# Patient Record
(demographics unavailable — no encounter records)

---

## 2025-02-15 NOTE — CONSULT LETTER
[FreeTextEntry2] : Miri Hua MD  [FreeTextEntry3] : Luis Alberto Downing MD Surgeon in Chief , Surgery  & System Chief, Pediatric Surgery Professor Surgery and Pediatrics [facility]

## 2025-02-15 NOTE — CONSULT LETTER
[FreeTextEntry2] : Miri Hua MD  [FreeTextEntry3] : Luis Alberto Downing MD Surgeon in Chief , Surgery  & System Chief, Pediatric Surgery Professor Surgery and Pediatrics [facility]

## 2025-02-15 NOTE — ASSESSMENT
[FreeTextEntry1] : Kt is a 5-year-old male here to follow up for a right inguinal scrotal testis and a possible left reducible inguinal hernia.   On exam today, the left testes was present in the scrotum and a left reducible inguinal hernia was present. The right testes was undescended, and palpable in the right inguinal canal. I counseled the family and discussed the nature of both in great detail.   I recommended left, possible right inguinal hernia repair. I discussed the indications, risks, benefits and alternatives to the procedure. The risks discussed included but were not limited to bleeding, infection, injury to intra-abdominal/pelvic contents, injury to spermatic cord/testicular loss, postoperative hydrocele and hernia recurrence. I counseled them about the possibility of developing an incarcerated hernia and they know to bring Kt to the emergency room with any concerns. I then explained to the family that a right orchiopexy can be performed at the time of the hernia repair. I discussed the procedure in detail with the family, including testicular loss, possible need for orchiectomy, and future scrotal procedures. They have indicated their overall understanding and have agreed to proceed with the discussed left inguinal hernia repair and right orchiopexy. We will schedule this electively. They have my information and know to contact me sooner with any questions or concerns.  They met Dr Kerline Mooney who will be the lead surgeon based upon the timing of the procedure.  They understand and consent to proceed.

## 2025-02-15 NOTE — HISTORY OF PRESENT ILLNESS
[FreeTextEntry1] : Kt is a 4 year old male here to follow up for a right inguinal scrotal testis and a possible left reducible inguinal hernia. Kt was last seen a year ago, where surgical intervention was discussed and offered. Since then, the mother denies any associated pains or discomfort from the area. She states that the swelling in the left groin continues to remain reducible without any signs of incarceration. Kt is tolerating meals well without emesis. No issues with bowel movements or urination reported.

## 2025-02-15 NOTE — ADDENDUM
[FreeTextEntry1] : Documented by Nedra Elizabeth acting as a scribe for Dr. Downing on 02/11/2025. All medical record entries made by the Scribe were at Dr. Downing's direction and personally dictated by me on 02/11/2025. I have reviewed the chart and agree that the record accurately reflects my personal performances of the history, physical exam, assessment, and plan. I have also personally directed, reviewed, and agree with the plan.

## 2025-07-15 NOTE — PHYSICAL EXAM
[NL] : grossly intact [TextBox_37] : Laparoscopic incisions are well healed [TextBox_67] : Right orchiopexy incision is well healed, testicles are palpable in the scrotum bilaterally

## 2025-07-15 NOTE — REASON FOR VISIT
[Patient] : patient [Mother] : mother [____ Month(s)] : [unfilled] month(s)  [Other: ____] : [unfilled] [de-identified] : 5/27/25 [de-identified] : Dr. Downing [de-identified] : Kt is a 5 year old male who is s/p laparoscopic left inguinal hernia repair and open right orchidopexy on 5/27/25. He has been doing well since the operation and has not complained of any associated pain or discomfort. He is eating well without emesis. Mom denies any issues with the surgical site and she is able to palpate the testicles bilaterally in the scrotum. No unexplained fevers or changes in energy levels noted.

## 2025-07-15 NOTE — CONSULT LETTER
[Dear  ___] : Dear  [unfilled], [Consult Letter:] : I had the pleasure of evaluating your patient, [unfilled]. [Please see my note below.] : Please see my note below. [Consult Closing:] : Thank you very much for allowing me to participate in the care of this patient.  If you have any questions, please do not hesitate to contact me. [Sincerely,] : Sincerely, [FreeTextEntry2] : Miri Hua MD [FreeTextEntry3] : Luis Alberto Downing MD Surgeon in Chief , Surgery  & System Chief, Pediatric Surgery Professor Surgery and Pediatrics [facility]

## 2025-07-15 NOTE — ADDENDUM
[FreeTextEntry1] : Documented by Jermaine Jimenez acting as a scribe for Dr. Downing on 07/15/2025.   All medical record entries made by the Scribe were at my, Dr. Downing, direction and personally dictated by me on 07/15/2025. I have reviewed the chart and agree that the record accurately reflects my personal performances of the history, physical exam, assessment and plan. I have also personally directed, reviewed, and agree with the instructions.

## 2025-07-15 NOTE — ASSESSMENT
[FreeTextEntry1] : Kt is a 5 year old male who is s/p laparoscopic left inguinal hernia repair and open right orchidopexy on 5/27/25. He is doing very well postoperatively and remains asymptomatic. I counseled mom and offered my reassurance regarding his recovery thus far. Both testicles are palpable in the scrotum on exam today. I reviewed postoperative expectations again and discussed the chance of developing testicular cancer in patients with a history of cryptorchidism. Mom knows to monitor moving forward for any development of symptoms and we have agreed to follow up on an as-needed basis moving forward. Kt is also cleared to resume all activities at this point without restriction. She has my information and knows to contact me with any questions or concerns.